# Patient Record
Sex: MALE | URBAN - METROPOLITAN AREA
[De-identification: names, ages, dates, MRNs, and addresses within clinical notes are randomized per-mention and may not be internally consistent; named-entity substitution may affect disease eponyms.]

---

## 2018-12-28 ENCOUNTER — OFFICE VISIT - HEALTHEAST (OUTPATIENT)
Dept: SURGERY | Facility: CLINIC | Age: 38
End: 2018-12-28

## 2018-12-28 DIAGNOSIS — R22.2 MASS ON BACK: ICD-10-CM

## 2018-12-28 ASSESSMENT — MIFFLIN-ST. JEOR: SCORE: 1855.19

## 2019-01-21 ENCOUNTER — RECORDS - HEALTHEAST (OUTPATIENT)
Dept: ADMINISTRATIVE | Facility: OTHER | Age: 39
End: 2019-01-21

## 2019-02-15 ASSESSMENT — MIFFLIN-ST. JEOR: SCORE: 1855.19

## 2019-02-18 ENCOUNTER — ANESTHESIA - HEALTHEAST (OUTPATIENT)
Dept: SURGERY | Facility: AMBULATORY SURGERY CENTER | Age: 39
End: 2019-02-18

## 2019-02-19 ENCOUNTER — SURGERY - HEALTHEAST (OUTPATIENT)
Dept: SURGERY | Facility: AMBULATORY SURGERY CENTER | Age: 39
End: 2019-02-19

## 2019-02-19 ASSESSMENT — MIFFLIN-ST. JEOR: SCORE: 1855.19

## 2021-06-02 VITALS — HEIGHT: 74 IN | BODY MASS INDEX: 24.77 KG/M2 | WEIGHT: 193 LBS

## 2021-06-02 VITALS — WEIGHT: 193 LBS | BODY MASS INDEX: 24.77 KG/M2 | HEIGHT: 74 IN

## 2021-06-22 NOTE — PROGRESS NOTES
Excision education & surgery packet provided to pt.    Carly Zuniga CMA ([facility])     Ph: [phone]    Fx: [phone]

## 2021-06-22 NOTE — PROGRESS NOTES
"History:   Oumar Hoang is a 38 y.o. male referred to see me for a back mass.  The patient states that it has been present for about 1 year.  It is present on his right mid back.  He notices it the most when he leans up against something hard.  Feels like he has a BB poking into his back.  Since he first noticed it, he thinks it has been about the same size.  It has never become red, infected, or required drainage.  He denies any other subcutaneous lesions.    Allergies:  Patient has no known allergies.    Past medical history:  Denies    Past surgical history:  Denies    Medications:  Denies    Family history:  Noncontributory    Social history:   reports that  has never smoked. he has never used smokeless tobacco. He reports that he does not drink alcohol or use drugs.    Review of Systems:  General: No complaints or constitutional symptoms  Skin: Mass to right mid back  Hematologic/Lymphatic: No symptoms or complaints  Psychiatric: No symptoms or complaints  Endocrine: No excessive fatigue, no hypermetabolic symptoms reported  Respiratory: No cough, shortness of breath, or wheezing  Cardiovascular: No chest pain or dyspnea on exertion  Gastrointestinal: No abdominal pain  Musculoskeletal: No recent injuries reported  Neurological: No focal neurologic defects reported    EXAM:  /68 (Patient Site: Right Arm, Patient Position: Sitting, Cuff Size: Adult Large)   Pulse 69   Ht 6' 2\" (1.88 m)   Wt 193 lb (87.5 kg)   SpO2 97%   BMI 24.78 kg/m    Body mass index is 24.78 kg/m .  General : Alert, cooperative, appears stated age   Skin: Skin color, texture, turgor normal, 5 cm soft fluctuant mass to the right mid back, consistent with lipoma  Lymphatic: No obvious adenopathy, no swelling   Eyes: No scleral icterus, pupils equal  HENT: No traumatic injury to the head or face, no gross abnormalities  Lungs: Normal respiratory effort, breath sounds equal bilaterally  Heart: Regular rate and rhythm  Abdomen: " Soft and nontender  Musculoskeletal: No obvious swelling  Neurologic: Grossly intact    Assessment/Plan:   1. Mass on back        Oumar Hoang is a 38 y.o. male with signs and symptoms consistent with back lipoma.  I have explained the pathophysiology of subcutaneous masses in detail as well as the surgical versus non-operative management strategies.  Due to the size and location, I recommend excising the lesion in the outpatient surgery center under MAC anesthetic.  He would be placed in the left lateral position.    The risks of surgery were discussed in detail which include, but are not limited to, bleeding, infection, and recurrence.  Additionally, the risks of non operative management were discussed which include, but are not limited to, continued discomfort and growth.    He understands everything which was discussed and has consented to proceed with an excision of the right posterior mid back.  We will schedule surgery accordingly.       Eufemia Pedersen, DO   [facility]  [phone]

## 2021-06-24 NOTE — ANESTHESIA PREPROCEDURE EVALUATION
Anesthesia Evaluation      No history of anesthetic complications     Airway   Mallampati: I   Pulmonary - negative ROS    breath sounds clear to auscultation                         Cardiovascular - negative ROS  Exercise tolerance: > or = 4 METS  Rhythm: regular  Rate: normal,         Neuro/Psych - negative ROS     Endo/Other    (-) no obesity     Comments: R mid back mass, s/f excision     GI/Hepatic/Renal - negative ROS           Dental - normal exam                        Anesthesia Plan  Planned anesthetic: MAC  MAC - lateral positioning per surgeon  Fire precautions, FiO2 <30% if surgical cautery required   ASA 1     Anesthetic plan and risks discussed with: patient  Anesthesia plan special considerations: antiemetics,   Post-op plan: routine recovery

## 2021-06-24 NOTE — ANESTHESIA POSTPROCEDURE EVALUATION
Patient: Oumar Hoang  EXCISION BACK MASS  Anesthesia type: MAC    Patient location: Phase II Recovery  Last vitals:   Vitals:    02/19/19 0925   BP: 98/51   Pulse: 81   Resp: 15   Temp: 36.6  C (97.9  F)   SpO2: 97%     Post vital signs: stable  Level of consciousness: awake and responds to simple questions  Post-anesthesia pain: pain controlled  Post-anesthesia nausea and vomiting: no  Pulmonary: unassisted, return to baseline  Cardiovascular: stable and blood pressure at baseline  Hydration: adequate  Anesthetic events: no    QCDR Measures:  ASA# 11 - Makeda-op Cardiac Arrest: ASA11B - Patient did NOT experience unanticipated cardiac arrest  ASA# 12 - Makeda-op Mortality Rate: ASA12B - Patient did NOT die  ASA# 13 - PACU Re-Intubation Rate: NA - No ETT / LMA used for case  ASA# 10 - Composite Anes Safety: ASA10A - No serious adverse event    Additional Notes:

## 2021-11-28 ENCOUNTER — HEALTH MAINTENANCE LETTER (OUTPATIENT)
Age: 41
End: 2021-11-28

## 2022-09-10 ENCOUNTER — HEALTH MAINTENANCE LETTER (OUTPATIENT)
Age: 42
End: 2022-09-10

## 2023-01-21 ENCOUNTER — HEALTH MAINTENANCE LETTER (OUTPATIENT)
Age: 43
End: 2023-01-21

## 2024-02-18 ENCOUNTER — HEALTH MAINTENANCE LETTER (OUTPATIENT)
Age: 44
End: 2024-02-18